# Patient Record
(demographics unavailable — no encounter records)

---

## 2024-11-08 NOTE — DATA REVIEWED
[No studies available for review at this time.] : No studies available for review at this time. [FreeTextEntry1] : Lab done by PCP monthly INR/PT

## 2024-11-08 NOTE — HISTORY OF PRESENT ILLNESS
[House Calls Co-Management Patient] : [unfilled] is a House Calls co-management patient [A] : A [Patient is stable] : patient is stable [Education provided] : education provided [Patient] : patient [Spouse] : spouse [FreeTextEntry4] : Dr. Aruna Edouard Physiatrist  [LastPCPVisitDate] : December 2023 [LastSpecialistVisitDate] : 2/2023 [FreeTextEntry1] : PMHX: Hypertension, high cholesterol, a previous stroke (for which she takes Coumadin) resulting in residual right-sided weakness in 2017, h [FreeTextEntry2] : PMHX: Hypertension, high cholesterol, a previous stroke (Broca's aphasia) for which she takes Coumadin) resulting in residual right-sided weakness in 2017, heart valve surgery in 2022, borderline diabetes, moderate dementia, and neuropathy. She resides in a private home with her  and requires some assistance with activities of daily living (ADLs) and full assistance with instrumental activities of daily living (IADLs).   Interval Events: Patient denies any symptoms such as chest pain, dizziness, palpitations, shortness of breath, fevers, chills, nausea, vomiting, diarrhea, or any related symptoms. Patient alert and oriented x3, slow to respond when spoken to. Pt received FLu vaccine 10/18/24 at PCP office last INR 2.7 taking coumadin 3.75mg daily and 2.5mg saturday. done by PCP  Subjective:  -Appetite/weight: appetite good, Weight stable Speech: Some Aphasia, answer to some question short sentence.  -Gait/falls: gait steady, no falls.  -Pain: sometimes knee pain Controlled with Tylenol prn.  -Sleep: sleeps well.   -BMs: daily BM  -Urine: no concerns.   -Skin: intact  -Mood/memory: mood stable, Memory is poor. Maintenance: Diabetes screening last one 7/19/2023 Breast cancer screening 10/24/2023 colonoscopy 8/15/2019  Medical Issues:   a) CVA- Aphasia, On Coumadin, INR 3.3 on 3/4/2024, INR done monthly by PCP, continue to follow up with Dr. Chava Thomas, keep INR 2.5-3.5, next lab 3/18/2024. b) Moderate Dementia- on Donepezil, Memantine  c) HTN: on Amlodipine, metoprolol BP stable d) HLD- ON Atorvastatin e) Arthritis Tylenol PRN f) Neuropathy- On Gabapentin stable g) Aortic Valve repair- On Coumadin Stable   Social:  Lives w/ spouse, have CDPAP. Full code  Specialist: Urologist at [facility] last appt 1/2024, next appt /725//2024 Cardio DR JAIRON Gonzales next appt 1/27/25 PCP Dr Jennifer Navarro 10/18/24 last appointment

## 2024-11-08 NOTE — PHYSICAL EXAM
[Normal Sclera/Conjunctiva] : normal sclera/conjunctiva [Normal Outer Ear/Nose] : the ears and nose were normal in appearance [No JVD] : no jugular venous distention [No Respiratory Distress] : no respiratory distress [Normal Rate] : heart rate was normal  [Normal Bowel Sounds] : normal bowel sounds [Non Tender] : non-tender [Soft] : abdomen soft [No CVA Tenderness] : no ~M costovertebral angle tenderness [No Spinal Tenderness] : no spinal tenderness [Normal Gait] : normal gait [No Joint Swelling] : no joint swelling seen [No Rash] : no rash [No Skin Lesions] : no skin lesions [Oriented x3] : oriented to person, place, and time [de-identified] : Broca Aphasia from CVA, small talk

## 2024-11-08 NOTE — COUNSELING
[Overweight - ( BMI 25.1 - 29.9 )] : overweight -  ( BMI 25.1 - 29.9 ) [DASH diet recommended] : DASH diet recommended [Sodium restriction 2gm recommended] : sodium restriction 2 gm recommended [Non - Smoker] : non-smoker [Smoke/CO Detectors] : smoke/CO detectors [Remove clutter and unsafe carpeting to avoid falls] : remove clutter and unsafe carpeting to avoid falls [Date: ___] : mammogram completed on [unfilled] [] : foot exam [Not Recommended] : Aspirin use not recommended due to overall prognosis [Improve exercise tolerance] : improve exercise tolerance [Improve mobility] : improve mobility [Improve weight] : improve weight [Likely to achieve goals/desired outcomes] : likely to achieve goals/desired outcomes [Patient/Caregiver not ready to engage in discussion] : patient/caregiver not ready to engage in discussion [Full Code] : Code Status: Full Code [Use assistive device to avoid falls] : use assistive device to avoid falls

## 2024-11-08 NOTE — HEALTH RISK ASSESSMENT
[HRA Reviewed] : Health risk assessment reviewed [Independent] : feeding [Some assistance needed] : dressing [Full assistance needed] : managing finances [No falls in past year] : Patient reported no falls in the past year [TimeGetUpGo] : 10

## 2024-11-08 NOTE — REASON FOR VISIT
[Follow-Up] : a follow-up visit [Spouse] : spouse [Pre-Visit Preparation] : pre-visit preparation was done [Intercurrent Specialty/Sub-specialty Visits] : the patient has intercurrent specialty/sub-specialty visits [FreeTextEntry1] : Hypertension, high cholesterol, a previous stroke (for which she takes Coumadin) resulting in residual right-sided weakness in 2017, h [FreeTextEntry2] : Medical record review [FreeTextEntry3] : PCP, Cardio, Physiatrist, Uro

## 2024-11-08 NOTE — PHYSICAL EXAM
[Normal Sclera/Conjunctiva] : normal sclera/conjunctiva [Normal Outer Ear/Nose] : the ears and nose were normal in appearance [No JVD] : no jugular venous distention [No Respiratory Distress] : no respiratory distress [Normal Rate] : heart rate was normal  [Normal Bowel Sounds] : normal bowel sounds [Non Tender] : non-tender [Soft] : abdomen soft [No CVA Tenderness] : no ~M costovertebral angle tenderness [No Spinal Tenderness] : no spinal tenderness [Normal Gait] : normal gait [No Joint Swelling] : no joint swelling seen [No Rash] : no rash [No Skin Lesions] : no skin lesions [Oriented x3] : oriented to person, place, and time [de-identified] : Broca Aphasia from CVA, small talk

## 2024-11-08 NOTE — HISTORY OF PRESENT ILLNESS
[House Calls Co-Management Patient] : [unfilled] is a House Calls co-management patient [A] : A [Patient is stable] : patient is stable [Education provided] : education provided [Patient] : patient [Spouse] : spouse [LastPCPVisitDate] : December 2023 [FreeTextEntry4] : Dr. Aruna Edouard Physiatrist  [LastSpecialistVisitDate] : 2/2023 [FreeTextEntry1] : PMHX: Hypertension, high cholesterol, a previous stroke (for which she takes Coumadin) resulting in residual right-sided weakness in 2017, h [FreeTextEntry2] : PMHX: Hypertension, high cholesterol, a previous stroke (Broca's aphasia) for which she takes Coumadin) resulting in residual right-sided weakness in 2017, heart valve surgery in 2022, borderline diabetes, moderate dementia, and neuropathy. She resides in a private home with her  and requires some assistance with activities of daily living (ADLs) and full assistance with instrumental activities of daily living (IADLs).   Interval Events: Patient denies any symptoms such as chest pain, dizziness, palpitations, shortness of breath, fevers, chills, nausea, vomiting, diarrhea, or any related symptoms. Patient alert and oriented x3, slow to respond when spoken to. Pt received FLu vaccine 10/18/24 at PCP office last INR 2.7 taking coumadin 3.75mg daily and 2.5mg saturday. done by PCP  Subjective:  -Appetite/weight: appetite good, Weight stable Speech: Some Aphasia, answer to some question short sentence.  -Gait/falls: gait steady, no falls.  -Pain: sometimes knee pain Controlled with Tylenol prn.  -Sleep: sleeps well.   -BMs: daily BM  -Urine: no concerns.   -Skin: intact  -Mood/memory: mood stable, Memory is poor. Maintenance: Diabetes screening last one 7/19/2023 Breast cancer screening 10/24/2023 colonoscopy 8/15/2019  Medical Issues:   a) CVA- Aphasia, On Coumadin, INR 3.3 on 3/4/2024, INR done monthly by PCP, continue to follow up with Dr. Chava Thomas, keep INR 2.5-3.5, next lab 3/18/2024. b) Moderate Dementia- on Donepezil, Memantine  c) HTN: on Amlodipine, metoprolol BP stable d) HLD- ON Atorvastatin e) Arthritis Tylenol PRN f) Neuropathy- On Gabapentin stable g) Aortic Valve repair- On Coumadin Stable   Social:  Lives w/ spouse, have CDPAP. Full code  Specialist: Urologist at [facility] last appt 1/2024, next appt /725//2024 Cardio DR JAIRON Gonzales next appt 1/27/25 PCP Dr Jennifer Navarro 10/18/24 last appointment